# Patient Record
Sex: MALE | Race: WHITE
[De-identification: names, ages, dates, MRNs, and addresses within clinical notes are randomized per-mention and may not be internally consistent; named-entity substitution may affect disease eponyms.]

---

## 2018-04-09 ENCOUNTER — HOSPITAL ENCOUNTER (INPATIENT)
Dept: HOSPITAL 97 - ER | Age: 48
LOS: 1 days | Discharge: ANOTHER HOSPITAL | DRG: 282 | End: 2018-04-10
Attending: HOSPITALIST | Admitting: HOSPITALIST
Payer: SELF-PAY

## 2018-04-09 VITALS — BODY MASS INDEX: 37.2 KG/M2

## 2018-04-09 DIAGNOSIS — Z91.14: ICD-10-CM

## 2018-04-09 DIAGNOSIS — I10: ICD-10-CM

## 2018-04-09 DIAGNOSIS — I21.4: Primary | ICD-10-CM

## 2018-04-09 DIAGNOSIS — Z95.5: ICD-10-CM

## 2018-04-09 DIAGNOSIS — I25.10: ICD-10-CM

## 2018-04-09 DIAGNOSIS — E78.5: ICD-10-CM

## 2018-04-09 LAB
ALBUMIN SERPL BCP-MCNC: 4.1 G/DL (ref 3.2–5.5)
ALP SERPL-CCNC: 60 IU/L (ref 42–121)
ALT SERPL W P-5'-P-CCNC: 29 IU/L (ref 10–60)
AST SERPL W P-5'-P-CCNC: 31 IU/L (ref 10–42)
BUN BLD-MCNC: 12 MG/DL (ref 6–20)
CKMB CREATINE KINASE MB: 2.1 NG/ML (ref 0.3–4)
GLUCOSE SERPLBLD-MCNC: 149 MG/DL (ref 65–120)
HCT VFR BLD CALC: 49.2 % (ref 39.6–49)
INR BLD: 0.94
LYMPHOCYTES # SPEC AUTO: 1.4 K/UL (ref 0.7–4.9)
MAGNESIUM SERPL-MCNC: 2.2 MG/DL (ref 1.8–2.5)
MCH RBC QN AUTO: 29.9 PG (ref 27–35)
MCV RBC: 88.6 FL (ref 80–100)
PMV BLD: 8.5 FL (ref 7.6–11.3)
POTASSIUM SERPL-SCNC: 3.7 MEQ/L (ref 3.6–5)
RBC # BLD: 5.55 M/UL (ref 4.33–5.43)

## 2018-04-09 PROCEDURE — 85610 PROTHROMBIN TIME: CPT

## 2018-04-09 PROCEDURE — 80048 BASIC METABOLIC PNL TOTAL CA: CPT

## 2018-04-09 PROCEDURE — 83735 ASSAY OF MAGNESIUM: CPT

## 2018-04-09 PROCEDURE — 96375 TX/PRO/DX INJ NEW DRUG ADDON: CPT

## 2018-04-09 PROCEDURE — 99285 EMERGENCY DEPT VISIT HI MDM: CPT

## 2018-04-09 PROCEDURE — 80061 LIPID PANEL: CPT

## 2018-04-09 PROCEDURE — 71275 CT ANGIOGRAPHY CHEST: CPT

## 2018-04-09 PROCEDURE — 36415 COLL VENOUS BLD VENIPUNCTURE: CPT

## 2018-04-09 PROCEDURE — 84484 ASSAY OF TROPONIN QUANT: CPT

## 2018-04-09 PROCEDURE — 80076 HEPATIC FUNCTION PANEL: CPT

## 2018-04-09 PROCEDURE — 82553 CREATINE MB FRACTION: CPT

## 2018-04-09 PROCEDURE — 96372 THER/PROPH/DIAG INJ SC/IM: CPT

## 2018-04-09 PROCEDURE — 93458 L HRT ARTERY/VENTRICLE ANGIO: CPT

## 2018-04-09 PROCEDURE — 83880 ASSAY OF NATRIURETIC PEPTIDE: CPT

## 2018-04-09 PROCEDURE — 96374 THER/PROPH/DIAG INJ IV PUSH: CPT

## 2018-04-09 PROCEDURE — 85025 COMPLETE CBC W/AUTO DIFF WBC: CPT

## 2018-04-09 PROCEDURE — 96361 HYDRATE IV INFUSION ADD-ON: CPT

## 2018-04-09 PROCEDURE — 71045 X-RAY EXAM CHEST 1 VIEW: CPT

## 2018-04-09 PROCEDURE — 93005 ELECTROCARDIOGRAM TRACING: CPT

## 2018-04-09 PROCEDURE — 85730 THROMBOPLASTIN TIME PARTIAL: CPT

## 2018-04-09 PROCEDURE — 82550 ASSAY OF CK (CPK): CPT

## 2018-04-09 NOTE — EDPHYS
Physician Documentation                                                                           

 [facility]                                                                

Name: Leonid Metz                                                                          

Age: 48 yrs                                                                                       

Sex: Male                                                                                         

: 1970                                                                                   

MRN: [id number]                                                                                   

Arrival Date: 2018                                                                          

Time: 17:53                                                                                       

Account#: [id number]                                                                            

Bed 7                                                                                             

Private MD:                                                                                       

ED Physician Lloyd Lovelace                                                                      

HPI:                                                                                              

                                                                                             

18:15 This 48 yrs old  Male presents to ER via Ambulatory with complaints of Chest   cp  

      Pain.                                                                                       

18:15 The patient or guardian reports chest pain that is located primarily in the anterior    cp  

      chest wall.                                                                                 

18:15 Onset: 20 minute(s) ago. The pain radiates to back. Associated signs and symptoms:      cp  

      Pertinent positives: nausea, vomiting, Pertinent negatives: abdominal pain, cough,          

      diaphoresis, headache, recent travel, syncope. The chest pain is described as sharp.        

      Duration: The patient or guardian reports a single episode, that is still ongoing, and      

      unchanged.                                                                                  

                                                                                                  

Historical:                                                                                       

- Allergies:                                                                                      

18:05 No Known Allergies;                                                                     hj  

- PMHx:                                                                                           

18:05 Myocardial infarction;                                                                  hj  

- PSHx:                                                                                           

18:05 heart stents; Appendectomy;                                                             hj  

                                                                                                  

- Immunization history:: Adult Immunizations up to date.                                          

- Social history:: Smoking status: Patient/guardian denies using tobacco.                         

                                                                                                  

                                                                                                  

ROS:                                                                                              

18:20 Constitutional: Negative for body aches, chills, fever, poor PO intake.                 cp  

18:20 Eyes: Negative for injury, pain, redness, and discharge, ENT: Negative for injury,      cp  

      pain, and discharge, Neck: Negative for injury, pain, and swelling.                         

18:20 Cardiovascular: Positive for chest pain, Negative for edema, palpitations.                  

18:20 Respiratory: Negative for cough, shortness of breath, wheezing.                             

18:20 Abdomen/GI: Positive for nausea, vomiting, Negative for abdominal pain, diarrhea,           

      constipation, black/tarry stool, rectal bleeding.                                           

18:20 Back: Positive for radiated pain, Negative for injury or acute deformity, decreased         

      range of motion.                                                                            

18:20 : Negative for urinary symptoms.                                                          

18:20 MS/extremity: Negative for paresthesias, swelling.                                          

18:20 Skin: Negative for cellulitis, rash.                                                        

18:20 Neuro: Negative for altered mental status, headache, syncope, near syncope, weakness.       

18:20 All other systems are negative.                                                             

                                                                                                  

Exam:                                                                                             

18:25 Constitutional: The patient appears in no acute distress, alert, awake,                 cp  

      non-diaphoretic, non-toxic, well developed, well nourished, uncomfortable.                  

18:25 Head/Face:  Normocephalic, atraumatic. Eyes:  Pupils equal round and reactive to light, cp  

      extra-ocular motions intact.  Lids and lashes normal.  Conjunctiva and sclera are           

      non-icteric and not injected.  Cornea within normal limits.  Periorbital areas with no      

      swelling, redness, or edema. ENT:  Nares patent. No nasal discharge, no septal              

      abnormalities noted.  Tympanic membranes are normal and external auditory canals are        

      clear.  Oropharynx with no redness, swelling, or masses, exudates, or evidence of           

      obstruction, uvula midline.  Mucous membranes moist. Neck:  Trachea midline, no             

      thyromegaly or masses palpated, and no cervical lymphadenopathy.  Supple, full range of     

      motion without nuchal rigidity, or vertebral point tenderness.  No Meningismus.             

      Chest/axilla:  Normal chest wall appearance and motion.  Nontender with no deformity.       

      No lesions are appreciated.                                                                 

18:25 Cardiovascular: Rate: tachycardic, Rhythm: regular, Pulses: Pulses are 2+ in right          

      radial artery and left radial artery. Edema: is not appreciated, JVD: is not                

      appreciated.                                                                                

18:25 Respiratory: the patient does not display signs of respiratory distress,  Respirations:     

      normal, no use of accessory muscles, no retractions, no splinting, no tachypnea,            

      labored breathing, is not present, Breath sounds: are clear throughout, no decreased        

      breath sounds, no stridor, no wheezing.                                                     

18:25 Abdomen/GI: Inspection: abdomen appears normal, Bowel sounds: active, all quadrants,        

      Palpation: abdomen is soft and non-tender, in all quadrants, rebound tenderness, is not     

      appreciated, voluntary guarding, is not appreciated, involuntary guarding, is not           

      appreciated.                                                                                

18:25 Back: ROM is normal.                                                                        

18:25 Musculoskeletal/extremity: Exam is negative for calf tenderness, edema, injury,             

      Sensation intact.                                                                           

18:25 Skin: cellulitis, is not appreciated, no rash present.                                      

18:25 Neuro: Orientation: to person, place \T\ time. Mentation: is normal, Cerebellar function:   

      is grossly normal, Motor: moves all fours, strength is normal, Sensation: is normal.        

                                                                                                  

Vital Signs:                                                                                      

18:03  / 112; Pulse 110; Resp 18; Temp 97.1(O); Pulse Ox 96% on R/A; Weight 111.13 kg;  hj  

      Height 5 ft. 8 in. (172.72 cm); Pain 7/10;                                                  

18:35  / 95; Pulse 114; Resp 22; Pulse Ox 95% on R/A;                                   aj  

19:11  / 79; Pulse 80; Resp 18; Temp 98.; Pulse Ox 96% on R/A; Pain 4/10;               ak1 

20:44  / 72; Pulse 70; Resp 18; Pulse Ox 99% on R/A;                                    ea  

21:03  / 81; Pulse 78; Resp 18; Pulse Ox 99% on R/A; Pain 0/10;                         ea  

22:11  / 78; Pulse 80; Resp 18; Temp 98.0(O); Pulse Ox 100% on R/A; Pain 0/10;          ea  

18:03 Body Mass Index 37.25 (111.13 kg, 172.72 cm)                                            hj  

                                                                                                  

MDM:                                                                                              

18:10 Patient medically screened.                                                               

19:45 Data reviewed: vital signs, nurses notes, lab test result(s), EKG, radiologic studies,  cp  

      CT scan, plain films.                                                                       

19:45 Test interpretation: by ED physician or midlevel provider: ECG, plain radiologic        cp  

      studies.                                                                                    

19:45 The patient was given aspirin in the Emergency Department.                              cp  

19:45 Response to treatment: the patient's symptoms have markedly improved after treatment.     

      Physician consultation: Merritt Garcia MD was called at 19:45, was contacted at 19:45,       

      regarding admission, to the telemetry unit. patient's condition.                            

                                                                                                  

                                                                                             

18:15 Order name: Basic Metabolic Panel; Complete Time: 19:16                                 cp  

                                                                                             

19:17 Interpretation: Normal except: GLUC 149; GFR 63.                                        cp  

                                                                                             

18:15 Order name: BNP; Complete Time: 19:16                                                   cp  

                                                                                             

18:15 Order name: CBC with Diff; Complete Time: 19:04                                         cp  

                                                                                             

19:04 Interpretation: Normal except: RBC 5.55; HCT 49.2.                                      cp  

                                                                                             

18:15 Order name: Ckmb; Complete Time: 19:16                                                  cp  

                                                                                             

18:15 Order name: CPK; Complete Time: 19:16                                                   cp  

                                                                                             

18:15 Order name: LFT's; Complete Time: 19:16                                                 cp  

                                                                                             

18:15 Order name: Magnesium; Complete Time: 19:16                                             cp  

04/                                                                                             

18:15 Order name: PT-INR; Complete Time: 19:04                                                cp  

/                                                                                             

18:15 Order name: Ptt, Activated; Complete Time: 19:04                                        cp  

                                                                                             

18:15 Order name: Troponin (emerg Dept Use Only); Complete Time: 19:16                        cp  

/                                                                                             

19:17 Interpretation: Abnormal: TROPED 0.15.                                                  cp  

/                                                                                             

18:15 Order name: XRAY Chest (1 view); Complete Time: 20:27                                   cp  

                                                                                             

19:05 Order name: CT Chest Angio; Complete Time: 19:44                                        cp  

                                                                                             

18:15 Order name: EKG; Complete Time: 18:16                                                   cp  

                                                                                             

18:15 Order name: Cardiac monitoring; Complete Time: 18:40                                    cp  

                                                                                             

18:15 Order name: EKG - Nurse/Tech; Complete Time: 18:41                                      cp  

                                                                                             

18:15 Order name: IV Saline Lock; Complete Time: 18:40                                        cp  

                                                                                             

18:15 Order name: Labs collected and sent; Complete Time: 18:40                               cp  

                                                                                             

18:15 Order name: O2 Per Protocol; Complete Time: 18:40                                       cp  

/                                                                                             

18:15 Order name: O2 Sat Monitoring; Complete Time: 18:40                                     cp  

/                                                                                             

20:24 Order name: CONS Physician Consult                                                      EDMS

                                                                                                  

Administered Medications:                                                                         

18:39 Drug: Nitroglycerin 0.4 mg Route: Sublingual;                                           aj  

18:59 Follow up: Response: No adverse reaction                                                aj  

18:39 Drug: NS 0.9% 500 ml Route: IV; Rate: bolus; Site: right antecubital;                   aj  

18:59 Follow up: Response: No adverse reaction; IV Status: Completed infusion; IV Intake:     aj  

      500ml                                                                                       

18:39 Drug: Zofran 4 mg Route: IVP; Site: right antecubital;                                  aj  

18:59 Follow up: Response: Nausea is decreased                                                aj  

18:39 Drug: Aspirin Chewable Tablet 324 mg Route: PO;                                         aj  

18:59 Follow up: Response: No adverse reaction                                                aj  

18:58 Drug: NS 0.9% 1000 ml Route: IV; Rate: 100 ml/hr; Site: right antecubital;              aj  

20:38 Follow up: Response: No adverse reaction; IV Status: Completed infusion                 ea  

20:28 Drug: Lovenox 1 mg/kg Route: Sub-Q; Site: right lower abdomen;                          ea  

21:03 Follow up: Response: No adverse reaction                                                ea  

20:30 Drug: morphine 4 mg Route: IVP; Site: right antecubital;                                ea  

21:02 Follow up: Response: No adverse reaction                                                ea  

21:02 Follow up: Response: Pain is decreased                                                  ea  

20:37 Drug: Metoprolol 25 mg Route: PO;                                                       ea  

21:03 Follow up: Response: No adverse reaction                                                ea  

20:38 Drug: PlaVIX 300 mg Route: PO;                                                          ea  

21:03 Follow up: Response: No adverse reaction                                                ea  

                                                                                                  

                                                                                                  

Disposition:                                                                                      

04/10                                                                                             

11:40 Co-signature as Attending Physician, Lloyd Lovelace MD I agree with the assessment and  emigdio 

      plan of care.                                                                               

                                                                                                  

Disposition:                                                                                      

18 20:19 Hospitalization ordered by Merritt Garcia for Observation. Preliminary             

  diagnosis is Chest pain, unspecified.                                                           

- Bed requested for Telemetry/MedSurg (observation).                                              

- Status is Observation.                                                                      ea  

- Condition is Stable.                                                                            

- Problem is new.                                                                                 

- Symptoms have improved.                                                                         

UTI on Admission? No                                                                              

                                                                                                  

                                                                                                  

                                                                                                  

Signatures:                                                                                       

Dispatcher [employer]                           Samaria Benavides, RN                       Lloyd Mcneal MD MD cha Joaquin, Henry RN                      Lloyd Sousa PA PA cp Garcia, Cindy, RN                       Fabi Grant RN RN ea                                                   

                                                                                                  

**************************************************************************************************

## 2018-04-09 NOTE — RAD REPORT
EXAM DESCRIPTION:  RAD - Chest Single View - 4/9/2018 7:10 pm

 

CLINICAL HISTORY:  Chest pain, history of midline

 

COMPARISON:  November 2014

 

TECHNIQUE:  AP portable chest image was obtained 1907 hours .

 

FINDINGS:  No peripheral mass or consolidation. Failure or volume overload not suspected tear heart s
ize is prominent without vascular engorgement. This is a stable presentation. No measurable pleural e
ffusion and no pneumothorax. No gross bony abnormality seen. No acute aortic findings suspected.

 

IMPRESSION:  No acute cardiopulmonary process.

 

Cardiomegaly is present, similar to prior imaging, without acute failure or volume overload.

## 2018-04-09 NOTE — RAD REPORT
EXAM DESCRIPTION:  CT - Chest Angio - 4/9/2018 7:28 pm

 

CLINICAL HISTORY:

Chest pain, possible dissection

 

COMPARISON:  PE study 2011, chest film April 9, 2018

 

TECHNIQUE:  Dynamically enhanced 3 mm thick images of the chest, abdomen, and upper pelvis were obtai
liv during administration of approximately 150mL Isovue 370 IV contrast. Sagittal and coronal reconst
ruction images were generated and reviewed. Exam utilizes a protocol to evaluate entire course of the
 aorta.

 

All CT scans are performed using dose optimization technique as appropriate and may include automated
 exposure control or mA/KV adjustment according to patient size.

 

FINDINGS:  Aorta is normal in diameter with no dissection or other acute aortic findings. Reconstruct
ion images show no significant findings.

 

Pulmonary arteries are normal as well. No cardiomegaly, pericardial thickening or pericardial effusio
n.

 

No mass or infiltrate in the lung parenchyma. No pleural thickening, pleural effusion or pneumothorax
.

 

No abnormal mediastinal or hilar mass or lymphadenopathy seen. No chest wall mass or abnormal axillar
y lymphadenopathy.

 

Limited upper abdomen imaging shows multi stone cholelithiasis. Gallbladder is contracted. Acute gall
bladder process is unlikely. No biliary tree dilatation. Partially imaged liver shows questionable fa
tty infiltration. No focal lesions seen.

 

 

IMPRESSION:  Negative CT scan of the thoracic aorta.

 

No suspicious or significant mediastinal, hilar or lung parenchymal process.

 

No other significant findings on chest, abdomen and upper pelvis examination.

## 2018-04-10 VITALS — OXYGEN SATURATION: 97 %

## 2018-04-10 VITALS — TEMPERATURE: 100 F | DIASTOLIC BLOOD PRESSURE: 64 MMHG | SYSTOLIC BLOOD PRESSURE: 119 MMHG

## 2018-04-10 LAB
HDLC SERPL-MCNC: 26 MG/DL (ref 27–67)
LDLC SERPL CALC-MCNC: 62 MG/DL (ref ?–130)

## 2018-04-10 PROCEDURE — 4A023N7 MEASUREMENT OF CARDIAC SAMPLING AND PRESSURE, LEFT HEART, PERCUTANEOUS APPROACH: ICD-10-PCS

## 2018-04-10 PROCEDURE — B205YZZ PLAIN RADIOGRAPHY OF LEFT HEART USING OTHER CONTRAST: ICD-10-PCS

## 2018-04-10 PROCEDURE — B201YZZ PLAIN RADIOGRAPHY OF MULTIPLE CORONARY ARTERIES USING OTHER CONTRAST: ICD-10-PCS

## 2018-04-10 RX ADMIN — ATORVASTATIN CALCIUM SCH: 20 TABLET, FILM COATED ORAL at 13:52

## 2018-04-10 RX ADMIN — ATORVASTATIN CALCIUM SCH MG: 20 TABLET, FILM COATED ORAL at 13:52

## 2018-04-10 NOTE — P.SSS
Patient History


Date of Service: 04/10/18


Primary Care Provider: None


Reason for admission: Chest pain rule out


Allergies





No Known Allergies Allergy (Verified 11/08/14 16:50)


 





Home Medications: 








Aspirin Chewable [Aspirin Chewable*] 81 mg PO DAILY 04/09/18 








- Past Medical/Surgical History


Has patient received pneumonia vaccine in the past: No


Diabetic: No


-: Cardiac stents x2 2008-mid and distal LAD


-: Stent in 2012-LAD


-: Knee sx x2 to left knee and x1 to right knee


-: appendectomy


-: Cardiac catheterization





- Family History


  ** Father


-: Heart disease





  ** Mother


-: Heart disease





- Social History


Smoking Status: Never smoker


Alcohol use: Yes


CD- Drugs: No


Caffeine use: Yes


Place of Residence: Home





Review of Systems


10-point ROS is otherwise unremarkable





Physical Examination





- Vital Signs


Temperature: 97 F


Blood Pressure: 142/96


Pulse: 73


Respirations: 16


Pulse Ox (%): 95





- Physical Exam


General: Alert, In no apparent distress, Oriented x3


HEENT: Atraumatic, PERRLA, Mucous membr. moist/pink, EOMI, Sclerae nonicteric


Neck: Supple, 2+ carotid pulse no bruit, No LAD, Without JVD or thyroid 

abnormality


Respiratory: Clear to auscultation bilaterally, Normal air movement


Cardiovascular: Regular rate/rhythm, Normal S1 S2


Gastrointestinal: Normal bowel sounds, No tenderness


Musculoskeletal: No tenderness


Integumentary: No rashes


Neurological: Normal gait, Normal speech, Normal strength at 5/5 x4 extr, 

Normal tone, Normal affect


Lymphatics: No axilla or inguinal lymphadenopathy





- Studies


Laboratory Data (last 24 hrs)





04/10/18 04:26: Triglycerides 304 H, Cholesterol 149, HDL Cholesterol 26 L, 

Cholesterol/HDL Ratio 5.73


04/10/18 04:26: Troponin I 0.65 H*


04/09/18 22:32: Troponin I 0.40 H*


04/09/18 18:35: PT 11.1, INR 0.94, APTT 30.6


04/09/18 18:35: WBC 7.4, Hgb 16.6, Hct 49.2 H, Plt Count 210


04/09/18 18:35: B-Natriuretic Peptide 53


04/09/18 18:35: Sodium 137, Potassium 3.7, BUN 12, Creatinine 1.22, Glucose 149 

H, Magnesium 2.2, Total Bilirubin 0.6, AST 31, ALT 29, Alkaline Phosphatase 60








- Diagnosis (Problem(s))


(1) NSTEMI (non-ST elevated myocardial infarction)


Current Visit: Yes   Status: Acute   


Plan: 


NSTEMI with elevated troponin 


-Cardiac cath today with 100% LAD block and 70% OM block 


-Awaiting Transfer to [facility] 


-Cont with ASA, Lipitor and BB 











(2) Hypertension


Current Visit: Yes   Status: Acute   


Qualifiers: 


   Hypertension type: essential hypertension   Qualified Code(s): I10 - 

Essential (primary) hypertension   





(3) Noncompliance


Current Visit: Yes   Status: Chronic   





- Disposition


Disposition: TRANSFER TO [facility]


Diet: Regular


Activity: Ad makeda


Critical Care: No

## 2018-04-10 NOTE — EKG
Test Date:    2018-04-09               Test Time:    18:21:01

Technician:   ADRIANA                                    

                                                     

MEASUREMENT RESULTS:                                       

Intervals:                                           

Rate:         91                                     

AL:           140                                    

QRSD:         90                                     

QT:           340                                    

QTc:          418                                    

Axis:                                                

P:            47                                     

AL:           140                                    

QRS:          -3                                     

T:            66                                     

                                                     

INTERPRETIVE STATEMENTS:                                       

                                                     

Normal sinus rhythm

Inferior infarct, age undetermined

Abnormal ECG

Compared to ECG 11/08/2014 04:11:12

Myocardial infarct finding now present

Sinus tachycardia no longer present



Electronically Signed On 04-10-18 10:19:10 CDT by Nathaniel Schilling

## 2018-04-10 NOTE — P.HP
Certification for Inpatient


Patient admitted to: Inpatient


With expected LOS: >2 Midnights


Patient will require the following post-hospital care: None


Practitioner: I am a practitioner with admitting privileges, knowledge of 

patient current condition, hospital course, and medical plan of care.


Services: Services provided to patient in accordance with Admission 

requirements found in Title 42 Section 412.3 of the Code of Federal Regulations





Patient History


Date of Service: 04/09/18


Reason for admission: Chest pain rule out


History of Present Illness: 





Patient is a 48-year-old gentleman who came into the hospital with chest 

discomfort.  Pain was mainly in the sternal region and radiated to his back.  

He  has admitted to taking much of his medications because he is not able to 

afford it.  He has had 2 episodes were he has been admitted in the past and he 

has required 3 stents in his LAD.  The 1st 2 were placed in 2006 & the next 1 

in 2012. Patient is not taking any medications except for a baby aspirin.  He 

does have a lot of stressors in his life.  Patient will be admitted to the 

hospital for further evaluation.  Will continue to monitor serial troponins and 

EKG.  Will make patient NPO after midnight for possible intervention in a.m..


Allergies





No Known Allergies Allergy (Verified 11/08/14 16:50)


 





Home Medications: 








Aspirin Chewable [Aspirin Chewable*] 81 mg PO DAILY 04/09/18 








- Past Medical/Surgical History


Has patient received pneumonia vaccine in the past: No


Diabetic: No


-: Cardiac stents x2 2008-mid and distal LAD


-: Stent in 2012-LAD


-: Knee sx x2 to left knee and x1 to right knee


-: appendectomy


-: Cardiac catheterization





- Family History


  ** Father


Medical History: Heart disease





  ** Mother


Medical History: Heart disease





- Social History


Smoking Status: Never smoker


Alcohol use: Yes


CD- Drugs: No


Caffeine use: Yes


Place of Residence: Home





Review of Systems


10-point ROS is otherwise unremarkable





Physical Examination





- Vital Signs


Temperature: 99.1 F


Blood Pressure: 114/64


Pulse: 77


Respirations: 15


Pulse Ox (%): 95





- Physical Exam


General: Alert, In no apparent distress, Oriented x3


HEENT: Atraumatic, PERRLA, Mucous membr. moist/pink, EOMI, Sclerae nonicteric


Neck: Supple, 2+ carotid pulse no bruit, No LAD, Without JVD or thyroid 

abnormality


Respiratory: Clear to auscultation bilaterally, Normal air movement


Cardiovascular: Regular rate/rhythm, Normal S1 S2, No murmurs


Gastrointestinal: Normal bowel sounds, Soft and benign, Non-distended, No 

tenderness


Musculoskeletal: No clubbing, No swelling, No tenderness


Integumentary: No rashes


Neurological: Normal gait, Normal speech, Normal strength at 5/5 x4 extr, 

Normal tone, Sensation intact, Cranial nerves 3-12 intact, Normal affect


Lymphatics: No axilla or inguinal lymphadenopathy





- Studies


Laboratory Data (last 24 hrs)





04/10/18 04:26: Triglycerides 304 H, Cholesterol 149, HDL Cholesterol 26 L, 

Cholesterol/HDL Ratio 5.73


04/10/18 04:26: Troponin I 0.65 H*


04/09/18 22:32: Troponin I 0.40 H*


04/09/18 18:35: PT 11.1, INR 0.94, APTT 30.6


04/09/18 18:35: WBC 7.4, Hgb 16.6, Hct 49.2 H, Plt Count 210


04/09/18 18:35: B-Natriuretic Peptide 53


04/09/18 18:35: Sodium 137, Potassium 3.7, BUN 12, Creatinine 1.22, Glucose 149 

H, Magnesium 2.2, Total Bilirubin 0.6, AST 31, ALT 29, Alkaline Phosphatase 60








Assessment & Plan





- Problems (Diagnosis)


(1) Chest pain, rule out acute myocardial infarction


Current Visit: Yes   Status: Acute   





(2) Unstable angina


Current Visit: Yes   Status: Acute   





(3) Noncompliance


Current Visit: Yes   Status: Acute   





(4) Hypertension


Current Visit: Yes   Status: Acute   





- Plan





1. Serial troponins and EKG


2. Cardiology consultation


3. Echocardiogram and possible cardiac catheterization the morning


4. Anti-platelet therapy, anti coagulation, beta-blocker, statin, and O2 as 

needed


5. IV morphine for pain


6. NPO after midnight


7. GI and DVT prophylaxis


Discharge Plan: Home


Plan to discharge in: Greater than 2 days





- Advance Directives


Does patient have a Living Will: No


Does patient have a Durable POA for Healthcare: No





- Code Status/Comfort Care


Code Status Assessed: Yes


Code Status: Full Code


Critical Care: No


Time Spent Managing PTS Care (In Minutes): 50

## 2018-04-11 NOTE — CON
Date of Consultation:  04/10/2018



Admitted to Dr. Diggs's service on 04/09/2018.  I saw the patient on 04/10/2018.



Reason For Consultation:  Acute coronary syndrome.



History Of Present Illness:  Mr. Metz is a 48-year-old white male.  He is obviously noncompliant.  
We put a __________ LAD stent in him in 2008, really failed to come for followup; and then in 2012, c
asya into the office and had a stress Cardiolite for cardiac clearance for knee surgery.  We had not s
een him since, but came in with substernal chest pain with exertion, radiating to the back with some 
nausea, vomiting, diaphoresis.  EKG showed nonspecific changes, but he had a troponin of 0.65.  He wa
s noted to have triglyceride of 304, HDL of 26.  His chest x-ray was negative.  CT angiogram of the c
hest was negative.  Kidney function was normal.  He is now pain free.



Past Medical History:  Positive for history of CAD and PCI.  He has had an appendectomy and knee surg
manav in the past.



Allergies:  NONE.



Review of Systems:

Negative.



Social History:  Negative.



Family History:  Positive for heart disease.



Home Medications:  Include aspirin.



Physical Examination:

Vital Signs:  He weighed 244 pounds.  Vital signs are stable.  Afebrile. 

HEENT:  Negative. 

Chest:  Clear. 

Cardiac:  Exam revealed a regular rhythm and rate without any murmurs, gallops, or rubs. 

Neck:  Supple without any bruit, lymphadenopathy, JVD, or thyromegaly. 

Extremities:  Revealed no clubbing, cyanosis, or edema.



Diagnostic Data:  As stated earlier.



Impression And Plan:  

1.Non-ST elevation myocardial infarction.

2.Dyslipidemia.

3.History of coronary artery disease, status post LAD stent in 2008.

4.Noncompliance. 



Heart catheterization would be performed today.  The risks and the benefits of the procedure were dis
cussed with Mr. Metz and he agreed to proceed.  We will continue his present regimen right now.  He
 is on aspirin, Lovenox, beta blockers, and should be for sure on a statin and maybe fenofibrate down
 the road.





NB/MODL

DD:  04/10/2018 22:23:38Voice ID:  [id number]

DT:  04/11/2018 03:02:22Report ID:  [id number]

## 2018-04-11 NOTE — OP
Date of Procedure:  04/10/2018



Surgeon:  Nathaniel Schilling MD



The patient was admitted on 04/09/2018.  I saw him on 04/10/2018. 



He has non-ST elevation myocardial infarction.  Heart catheterization was suggested.  The patient und
erstands the risks and the benefits and agreed.



Procedure In Detail:  The patient was brought to the cath lab on 04/10/2018.  Soon after I saw him, h
e was prepped and draped in the routine sterile fashion.  He was given 4 mg of Versed for IV sedation
.  A 6-Bulgarian sheath was introduced in the right common femoral artery.  Star[employer] was used to close 
the case.  Angiography there was normal.  Six-Bulgarian catheters, left Sarah and right Sarah were u
sed to cannulate the left main and the right main.  The patient had a subtotal LAD before the stent t
hat he had in 2008 with MASHA 1 flow, ST elevation just from the injection and some slight chest pain.
  The circumflex distally showed about 70% long stenosis.  Has a small ramus with a 90% ostial stenos
is.  The RCA was normal with some collateral to the LAD.  LV-gram was done.  Normal ejection fraction
.  Left ventricular end-diastolic pressure was 22 mmHg.  He had a normal blood pressure.



Estimated Blood Loss:  5 cc.



Complications:  No complications.



Operators:  Maxine Roth. 



Total conscious sedation was 30 minutes.  The plan was to transfer the patient to [facility]
 for bypass surgery to include a LIMA to the LAD and a vein graft to the OM.  The case was discussed 
with him 

and his family and everybody was aware of his conditions.  I will discuss the case further with Dr. NESTOR goldstein as well.





NB/LIZBETH

DD:  04/10/2018 22:26:22Voice ID:  [id number]

DT:  04/11/2018 02:50:18Report ID:  [id number]

## 2020-07-27 NOTE — ER
Nurse's Notes                                                                                     

 [facility]                                                                

Name: Leonid Metz                                                                          

Age: 48 yrs                                                                                       

Sex: Male                                                                                         

: 1970                                                                                   

MRN: [id number]                                                                                   

Arrival Date: 2018                                                                          

Time: 17:53                                                                                       

Account#: [id number]                                                                            

Bed 7                                                                                             

Private MD:                                                                                       

Diagnosis: Chest pain, unspecified                                                                

                                                                                                  

Presentation:                                                                                     

                                                                                             

18:03 Presenting complaint: Patient states: hx of MI with 2 stents; complaints of sharp pain  hj  

      that started 20 mins ago and is getting worse; reports nausea and vomiting;. Transition     

      of care: patient was not received from another setting of care. Onset of symptoms was       

      2018. Care prior to arrival: None.                                                

18:03 Method Of Arrival: Ambulatory                                                           hj  

18:03 Acuity: ENRICO 3                                                                           hj  

                                                                                                  

Triage Assessment:                                                                                

18:05 General: Appears in no apparent distress. uncomfortable, Behavior is cooperative,       hj  

      appropriate for age, anxious. Pain: Complains of pain in chest Pain radiates to back.       

      Cardiovascular: Capillary refill < 3 seconds Patient's skin is warm and dry.                

                                                                                                  

Historical:                                                                                       

- Allergies:                                                                                      

18:05 No Known Allergies;                                                                     hj  

- PMHx:                                                                                           

18:05 Myocardial infarction;                                                                  hj  

- PSHx:                                                                                           

18:05 heart stents; Appendectomy;                                                             hj  

                                                                                                  

- Immunization history:: Adult Immunizations up to date.                                          

- Social history:: Smoking status: Patient/guardian denies using tobacco.                         

                                                                                                  

                                                                                                  

Screenin:35 Abuse screen: Denies threats or abuse. Denies injuries from another. Nutritional        aj  

      screening: No deficits noted. Tuberculosis screening: No symptoms or risk factors           

      identified. Fall Risk None identified.                                                      

                                                                                                  

Assessment:                                                                                       

18:06 Pain: Pain began 1 hour ago.                                                            hj  

18:35 General: Appears in no apparent distress. comfortable, Behavior is calm, cooperative,   aj  

      appropriate for age. Pain: Complains of pain in chest Aggravated by repositioning.          

      Neuro: Level of Consciousness is awake, alert, obeys commands, Oriented to person,          

      place, time, situation. Cardiovascular: Reports chest pain, nausea, vomiting, Capillary     

      refill < 3 seconds in bilateral fingers Patient's skin is warm and dry. Rhythm is sinus     

      tachycardia. Respiratory: Airway is patent Respiratory effort is even, unlabored,           

      Respiratory pattern is regular, symmetrical. GI: Reports nausea, vomiting. Derm: Skin       

      is intact, is healthy with good turgor, Skin is pink, warm \T\ dry. normal.                 

19:55 General: Appears in no apparent distress. comfortable, Behavior is calm, cooperative,   ea  

      appropriate for age. Pain: Complains of pain in chest Pain radiates to back Pain            

      currently is 3 out of 10 on a pain scale. Pain began 1 hour ago. Neuro: Level of            

      Consciousness is awake, alert, obeys commands, Oriented to person, place, time,             

      situation. Cardiovascular: Capillary refill < 3 seconds Patient's skin is warm and dry.     

      Respiratory: Airway is patent Respiratory effort is even, unlabored, Respiratory            

      pattern is regular, symmetrical. GI: Abdomen is non-distended. Derm: Skin is pink, warm     

      \T\ dry. normal.                                                                            

20:30 Reassessment: Patient and/or family updated on plan of care and expected duration. Pain ea  

      level reassessed. Patient is alert, oriented x 3, equal unlabored respirations, skin        

      warm/dry/pink.                                                                              

21:45 Reassessment: Patient and/or family updated on plan of care and expected duration. Pain ea  

      level reassessed. Patient is alert, oriented x 3, equal unlabored respirations, skin        

      warm/dry/pink.                                                                              

22:10 Reassessment: Patient and/or family updated on plan of care and expected duration. Pain ea  

      level reassessed. Patient is alert, oriented x 3, equal unlabored respirations, skin        

      warm/dry/pink. Report called to receiving nurse.                                            

                                                                                                  

Vital Signs:                                                                                      

18:03  / 112; Pulse 110; Resp 18; Temp 97.1(O); Pulse Ox 96% on R/A; Weight 111.13 kg;  hj  

      Height 5 ft. 8 in. (172.72 cm); Pain 7/10;                                                  

18:35  / 95; Pulse 114; Resp 22; Pulse Ox 95% on R/A;                                   aj  

19:11  / 79; Pulse 80; Resp 18; Temp 98.; Pulse Ox 96% on R/A; Pain 4/10;               ak1 

20:44  / 72; Pulse 70; Resp 18; Pulse Ox 99% on R/A;                                    ea  

21:03  / 81; Pulse 78; Resp 18; Pulse Ox 99% on R/A; Pain 0/10;                         ea  

22:11  / 78; Pulse 80; Resp 18; Temp 98.0(O); Pulse Ox 100% on R/A; Pain 0/10;          ea  

18:03 Body Mass Index 37.25 (111.13 kg, 172.72 cm)                                              

                                                                                                  

ED Course:                                                                                        

17:53 Patient arrived in ED.                                                                  mr  

18:05 Triage completed.                                                                       hj  

18:06 Arm band placed on left wrist.                                                          hj  

18:06 Patient maintains SpO2 saturation greater than 95% on room air.                         hj  

18:10 Lloyd Conrad PA is PHCP.                                                                cp  

18:10 Lloyd Lovelace MD is Attending Physician.                                             cp  

18:27 Samaria Conti, RN is Primary Nurse.                                                     aj  

18:35 Patient has correct armband on for positive identification. Placed in gown. Bed in low  aj  

      position. Call light in reach. Side rails up X 1. Adult w/ patient. Cardiac monitor on.     

      Pulse ox on. NIBP on.                                                                       

18:35 Inserted saline lock: 18 gauge in right antecubital area, using aseptic technique.      aj  

      Blood collected. By Jack Raines.                                                          

19:06 X-ray completed. Portable x-ray completed in exam room. Patient tolerated procedure     kc2 

      well.                                                                                       

19:08 Tena Blount, RN is Primary Nurse.                                                  

19:08 XRAY Chest (1 view) In Process Unspecified.                                             EDMS

19:11 No provider procedures requiring assistance completed.                                  ak1 

19:23 Primary Nurse role handed off by Tena Blount, RN                                 rg2 

19:28 CT Chest Angio In Process Unspecified.                                                  EDMS

20:13 Fabi Rivera, JUANA is Primary Nurse.                                                    ea  

20:19 Merritt Garcia MD is Hospitalizing Provider.                                           cp  

20:42 Patient admitted, IV remains in place.                                                  ea  

                                                                                                  

Administered Medications:                                                                         

18:39 Drug: Nitroglycerin 0.4 mg Route: Sublingual;                                           aj  

18:59 Follow up: Response: No adverse reaction                                                aj  

18:39 Drug: NS 0.9% 500 ml Route: IV; Rate: bolus; Site: right antecubital;                   aj  

18:59 Follow up: Response: No adverse reaction; IV Status: Completed infusion; IV Intake:     aj  

      500ml                                                                                       

18:39 Drug: Zofran 4 mg Route: IVP; Site: right antecubital;                                  aj  

18:59 Follow up: Response: Nausea is decreased                                                aj  

18:39 Drug: Aspirin Chewable Tablet 324 mg Route: PO;                                         aj  

18:59 Follow up: Response: No adverse reaction                                                aj  

18:58 Drug: NS 0.9% 1000 ml Route: IV; Rate: 100 ml/hr; Site: right antecubital;              aj  

20:38 Follow up: Response: No adverse reaction; IV Status: Completed infusion                 ea  

20:28 Drug: Lovenox 1 mg/kg Route: Sub-Q; Site: right lower abdomen;                          ea  

21:03 Follow up: Response: No adverse reaction                                                ea  

20:30 Drug: morphine 4 mg Route: IVP; Site: right antecubital;                                ea  

21:02 Follow up: Response: No adverse reaction                                                ea  

21: Follow up: Response: Pain is decreased                                                  ea  

20:37 Drug: Metoprolol 25 mg Route: PO;                                                       ea  

21:03 Follow up: Response: No adverse reaction                                                ea  

20:38 Drug: PlaVIX 300 mg Route: PO;                                                          ea  

21:03 Follow up: Response: No adverse reaction                                                ea  

                                                                                                  

                                                                                                  

Intake:                                                                                           

18:59 IV: 500ml; Total: 500ml.                                                                aj  

                                                                                                  

Outcome:                                                                                          

20:19 Decision to Hospitalize by Provider.                                                    maxine  

20:42 Instructed on the need for admit.                                                       ea  

22:10 Admitted to Med/surg accompanied by tech, via wheelchair, room 431, Report called to    ea  

      Receiving nurse.                                                                            

22:10 Condition: stable                                                                           

22:12 Patient left the ED.                                                                    ea  

                                                                                                  

Signatures:                                                                                       

Dispatcher [employer]                           EDMS                                                 

Nadeem New2                                                  

Tena Blount, RN                  Samaria Narayan ch RN                       Kiersten Otero                                mr                                                   

Nadia Ramos RN                       RN   ak1                                                  

Jorge Paris, RN                      Lloyd Sousa PA PA cp Carr, Kelsie                                 kc2                                                  

Fabi Rivera RN RN ea                                                   

                                                                                                  

************************************************************************************************** 2 person assist

## 2020-11-16 LAB
ALBUMIN SERPL-MCNC: 4 G/DL (ref 3.5–5)
ALBUMIN/GLOB SERPL: 1.1 {RATIO} (ref 0.8–2)
ALP SERPL-CCNC: 65 IU/L (ref 40–150)
ALT SERPL-CCNC: 19 IU/L (ref 0–55)
ANION GAP SERPL CALC-SCNC: 11.4 MMOL/L (ref 8–16)
BASOPHILS # BLD AUTO: 0 10*3/UL (ref 0–0.1)
BASOPHILS NFR BLD AUTO: 0.4 % (ref 0–1)
BUN SERPL-MCNC: 10 MG/DL (ref 7–26)
BUN/CREAT SERPL: 8 (ref 6–25)
CALCIUM SERPL-MCNC: 9.3 MG/DL (ref 8.4–10.2)
CHLORIDE SERPL-SCNC: 108 MMOL/L (ref 98–107)
CO2 SERPL-SCNC: 27 MMOL/L (ref 22–29)
DEPRECATED INR PLAS: 0.85
DEPRECATED NEUTROPHILS # BLD AUTO: 4.4 10*3/UL (ref 2.1–6.9)
EGFRCR SERPLBLD CKD-EPI 2021: 59 ML/MIN (ref 60–?)
EOSINOPHIL # BLD AUTO: 0.3 10*3/UL (ref 0–0.4)
EOSINOPHIL NFR BLD AUTO: 4.3 % (ref 0–6)
ERYTHROCYTE [DISTWIDTH] IN CORD BLOOD: 12.6 % (ref 11.7–14.4)
GLOBULIN PLAS-MCNC: 3.5 G/DL (ref 2.3–3.5)
GLUCOSE SERPLBLD-MCNC: 97 MG/DL (ref 74–118)
HCT VFR BLD AUTO: 50.9 % (ref 38.2–49.6)
HGB BLD-MCNC: 17.6 G/DL (ref 14–18)
LYMPHOCYTES # BLD: 1.5 10*3/UL (ref 1–3.2)
LYMPHOCYTES NFR BLD AUTO: 21.9 % (ref 18–39.1)
MCH RBC QN AUTO: 30.4 PG (ref 28–32)
MCHC RBC AUTO-ENTMCNC: 34.6 G/DL (ref 31–35)
MCV RBC AUTO: 87.9 FL (ref 81–99)
MONOCYTES # BLD AUTO: 0.7 10*3/UL (ref 0.2–0.8)
MONOCYTES NFR BLD AUTO: 9.7 % (ref 4.4–11.3)
NEUTS SEG NFR BLD AUTO: 63.4 % (ref 38.7–80)
PLATELET # BLD AUTO: 192 X10E3/UL (ref 140–360)
POTASSIUM SERPL-SCNC: 4.4 MMOL/L (ref 3.5–5.1)
PROTHROMBIN TIME: 12.1 SECONDS (ref 11.9–14.5)
RBC # BLD AUTO: 5.79 X10E6/UL (ref 4.3–5.7)
SODIUM SERPL-SCNC: 142 MMOL/L (ref 136–145)

## 2020-11-19 ENCOUNTER — HOSPITAL ENCOUNTER (OUTPATIENT)
Dept: HOSPITAL 88 - CATH LAB | Age: 50
Discharge: HOME | End: 2020-11-19
Attending: INTERNAL MEDICINE
Payer: COMMERCIAL

## 2020-11-19 VITALS — SYSTOLIC BLOOD PRESSURE: 152 MMHG | DIASTOLIC BLOOD PRESSURE: 99 MMHG

## 2020-11-19 VITALS — SYSTOLIC BLOOD PRESSURE: 149 MMHG | DIASTOLIC BLOOD PRESSURE: 80 MMHG

## 2020-11-19 VITALS — DIASTOLIC BLOOD PRESSURE: 94 MMHG | SYSTOLIC BLOOD PRESSURE: 149 MMHG

## 2020-11-19 VITALS — SYSTOLIC BLOOD PRESSURE: 154 MMHG | DIASTOLIC BLOOD PRESSURE: 99 MMHG

## 2020-11-19 VITALS — DIASTOLIC BLOOD PRESSURE: 77 MMHG | SYSTOLIC BLOOD PRESSURE: 145 MMHG

## 2020-11-19 VITALS — SYSTOLIC BLOOD PRESSURE: 156 MMHG | DIASTOLIC BLOOD PRESSURE: 106 MMHG

## 2020-11-19 VITALS — DIASTOLIC BLOOD PRESSURE: 101 MMHG | SYSTOLIC BLOOD PRESSURE: 139 MMHG

## 2020-11-19 VITALS — WEIGHT: 251 LBS | BODY MASS INDEX: 39.39 KG/M2 | HEIGHT: 67 IN

## 2020-11-19 VITALS — SYSTOLIC BLOOD PRESSURE: 147 MMHG | DIASTOLIC BLOOD PRESSURE: 90 MMHG

## 2020-11-19 VITALS — DIASTOLIC BLOOD PRESSURE: 95 MMHG | SYSTOLIC BLOOD PRESSURE: 154 MMHG

## 2020-11-19 DIAGNOSIS — Z79.82: ICD-10-CM

## 2020-11-19 DIAGNOSIS — I25.2: ICD-10-CM

## 2020-11-19 DIAGNOSIS — I25.810: Primary | ICD-10-CM

## 2020-11-19 DIAGNOSIS — E78.5: ICD-10-CM

## 2020-11-19 DIAGNOSIS — Z95.5: ICD-10-CM

## 2020-11-19 DIAGNOSIS — E66.9: ICD-10-CM

## 2020-11-19 DIAGNOSIS — Z82.49: ICD-10-CM

## 2020-11-19 DIAGNOSIS — Z95.1: ICD-10-CM

## 2020-11-19 DIAGNOSIS — Z20.828: ICD-10-CM

## 2020-11-19 DIAGNOSIS — R94.39: ICD-10-CM

## 2020-11-19 DIAGNOSIS — I10: ICD-10-CM

## 2020-11-19 DIAGNOSIS — Z01.812: ICD-10-CM

## 2020-11-19 PROCEDURE — 85025 COMPLETE CBC W/AUTO DIFF WBC: CPT

## 2020-11-19 PROCEDURE — 92929: CPT

## 2020-11-19 PROCEDURE — 36415 COLL VENOUS BLD VENIPUNCTURE: CPT

## 2020-11-19 PROCEDURE — 85610 PROTHROMBIN TIME: CPT

## 2020-11-19 PROCEDURE — 99152 MOD SED SAME PHYS/QHP 5/>YRS: CPT

## 2020-11-19 PROCEDURE — 93459 L HRT ART/GRFT ANGIO: CPT

## 2020-11-19 PROCEDURE — 99153 MOD SED SAME PHYS/QHP EA: CPT

## 2020-11-19 PROCEDURE — 92928 PRQ TCAT PLMT NTRAC ST 1 LES: CPT

## 2020-11-19 PROCEDURE — 80053 COMPREHEN METABOLIC PANEL: CPT
